# Patient Record
Sex: FEMALE | Race: AMERICAN INDIAN OR ALASKA NATIVE
[De-identification: names, ages, dates, MRNs, and addresses within clinical notes are randomized per-mention and may not be internally consistent; named-entity substitution may affect disease eponyms.]

---

## 2017-08-17 ENCOUNTER — HOSPITAL ENCOUNTER (OUTPATIENT)
Dept: HOSPITAL 5 - SPVWC | Age: 47
Discharge: HOME | End: 2017-08-17
Attending: FAMILY MEDICINE
Payer: COMMERCIAL

## 2017-08-17 DIAGNOSIS — Z12.31: Primary | ICD-10-CM

## 2017-08-17 PROCEDURE — 77067 SCR MAMMO BI INCL CAD: CPT

## 2017-08-17 PROCEDURE — G0202 SCR MAMMO BI INCL CAD: HCPCS

## 2017-08-17 NOTE — MAMMOGRAPHY REPORT
Screening mammogram:



Routine views compared to her prior exams dating back to 2015. There is 

a market progression of calcifications distributed throughout the upper 

outer right breast. Many of these are scattered and singular but there 

are some clusters. There is minimal pleomorphism. The remainder the 

breast pattern bilaterally is heterogeneous and diffusely distributed. 

It is unchanged.



CAD used.



Impressions:



Progressive calcifications of the right breast.



Recommendation:



Right breast magnification views of the upper outer quadrant.



BI-RADS CATEGORY:  0 = Needs additional imaging evaluation



ACR BI-RADS MAMMOGRAPHIC CODES:

0 = Needs additional imaging evaluation; 1 = Negative; 2 = Benign; 3 = 

Probably benign; 4 = Suspicious; 5 = Malignant; 6 = Known biopsy-proven 

malignancy



COMMENT:

      1.   Dense breast tissue, i.e., adenosis, fibrocystic    

            changes, etc., may obscure an underlying neoplasm.

      2.   Approximately 10% of cancers are not detected with

            mammography.

      3.   A negative mammography report should not delay biopsy 

            if a clinically suspicious mass is present.

## 2017-09-05 ENCOUNTER — HOSPITAL ENCOUNTER (OUTPATIENT)
Dept: HOSPITAL 5 - SPVWC | Age: 47
Discharge: HOME | End: 2017-09-05
Attending: FAMILY MEDICINE
Payer: MEDICARE

## 2017-09-05 DIAGNOSIS — R92.1: Primary | ICD-10-CM

## 2017-09-05 NOTE — MAMMOGRAPHY REPORT
RIGHT DIGITAL DIAGNOSTIC MAMMOGRAM : 09/05/17 08:18:00



CLINICAL: Recalled for calcifications.



COMPARISON:08/17/17



FINDINGS: LM and CC magnification views demonstrate scattered 

retroareolar central and slightly lateral and superior primarily 

punctate calcifications.A few calcifications layer on the lateral view. 

 No associated mass or architectural distortion.  







IMPRESSION: Probably benign calcifications.



BI-RADS CATEGORY:  3 -- Probably Benign



RECOMMENDATION: 6 month follow-up magnification views right breast.



ACR BI-RADS MAMMOGRAPHIC CODES:

0 = Needs additional imaging evaluation; 1 = Negative; 2 = Benign; 3 = 

Probably benign; 4 = Suspicious; 5 = Malignant; 6 = Known biopsy-proven 

malignancy



COMMENT:

      1.   Dense breast tissue, i.e., adenosis, fibrocystic 

            changes, etc., may obscure an underlying neoplasm.

      2.   Approximately 10% of cancers are not detected with

            mammography.

      3.   A negative mammography report should not delay biopsy 

            if a clinically suspicious mass is present.





COMMENT:

Patient follow-up letters are generated by our [employer] application.

## 2018-03-02 ENCOUNTER — HOSPITAL ENCOUNTER (OUTPATIENT)
Dept: HOSPITAL 5 - SPVWC | Age: 48
Discharge: HOME | End: 2018-03-02
Attending: FAMILY MEDICINE
Payer: MEDICARE

## 2018-03-02 DIAGNOSIS — R92.1: Primary | ICD-10-CM

## 2018-03-02 NOTE — MAMMOGRAPHY REPORT
RIGHT DIGITAL DIAGNOSTIC MAMMOGRAM : 03/02/18 08:05:00



CLINICAL: Six month follow-up calcifications.



COMPARISON:09/05/17



FINDINGS: Routine views plus LM and CC magnification views were 

performed.  Stable scattered upper outer and central retroareolar 

predominantly punctate calcifications.  No suspicious forms or 

distributions.  A few calcifications layer on lateral spots.  No mass 

or architectural distortion.



IMPRESSION: Stable probably benign calcifications.



BI-RADS CATEGORY:  3 -- Probably Benign



RECOMMENDATION: Bilateral diagnostic mammogram with right magnification 

views in six months.



ACR BI-RADS MAMMOGRAPHIC CODES:

0 = Needs additional imaging evaluation; 1 = Negative; 2 = Benign; 3 = 

Probably benign; 4 = Suspicious; 5 = Malignant; 6 = Known biopsy-proven 

malignancy



COMMENT:

      1.   Dense breast tissue, i.e., adenosis, fibrocystic 

            changes, etc., may obscure an underlying neoplasm.

      2.   Approximately 10% of cancers are not detected with

            mammography.

      3.   A negative mammography report should not delay biopsy 

            if a clinically suspicious mass is present.





COMMENT:

Patient follow-up letters are generated by our [employer] application.

## 2018-09-06 ENCOUNTER — HOSPITAL ENCOUNTER (OUTPATIENT)
Dept: HOSPITAL 5 - SPVWC | Age: 48
Discharge: HOME | End: 2018-09-06
Attending: FAMILY MEDICINE
Payer: MEDICARE

## 2018-09-06 DIAGNOSIS — R92.8: Primary | ICD-10-CM

## 2018-09-06 PROCEDURE — 77066 DX MAMMO INCL CAD BI: CPT

## 2018-09-06 NOTE — MAMMOGRAPHY REPORT
BILATERAL DIGITAL DIAGNOSTIC MAMMOGRAM with CAD: 09/06/18 08:27:00



CLINICAL: Six month follow-up right calcifications and routine 

screening of the left breast.



COMPARISON:03/02/18 and 09/05/17 right mammograms and 08/17/17 

bilateral mammogram.



FINDINGS:Routine views plus spot magnification views of the right 

breast were performed.  Stable scattered right upper outer and central 

retroareolar calcifications which are predominantly punctate.  A few 

calcifications layer.  No suspicious forms.  No mass or architectural 

distortion.  The left breast is negative with a few scattered benign 

calcifications. 







IMPRESSION: Bilateral benign calcifications and no suspicious finding.



BI-RADS CATEGORY:  2 - - Benign



RECOMMENDATION: Return to routine mammographic screening in one year.



ACR BI-RADS MAMMOGRAPHIC CODES:

0 = Needs additional imaging evaluation; 1 = Negative; 2 = Benign; 3 = 

Probably benign; 4 = Suspicious; 5 = Malignant; 6 = Known biopsy-proven 

malignancy



COMMENT:

      1.   Dense breast tissue, i.e., adenosis, fibrocystic 

            changes, etc., may obscure an underlying neoplasm.

      2.   Approximately 10% of cancers are not detected with

            mammography.

      3.   A negative mammography report should not delay biopsy 

            if a clinically suspicious mass is present.





COMMENT:

Patient follow-up letters are generated by our [employer] application.

## 2019-10-18 NOTE — MAMMOGRAPHY REPORT
DIGITAL SCREENING MAMMOGRAM WITH CAD, 10/18/2019



INDICATION: Routine screening mammography. 



TECHNIQUE:  Digital bilateral  2D mammography was obtained in the craniocaudal and mediolateral obliq
ue projections. This examination was interpreted with the benefit of Computer-Aided Detection analysi
s.



COMPARISON: 9/6/2018



FINDINGS: 



Breast Density: The breasts are heterogeneously dense, which may obscure small masses.



There is no evidence of dominant mass, suspicious calcifications or architectural distortion in eithe
r breast. A few bilateral scattered calcifications with benign morphology.



IMPRESSION: No mammographic evidence of malignancy.



Follow up recommendation: Routine yearly



BI-RADS Category 2:  Benign.



A "normal" or negative report should not discourage follow up or biopsy of a clinically significant f
inding.



A written summary of these findings will be mailed to the patient. The patient will be entered into a
 mammography reporting system which will generate a reminder letter for the patient's next appointmen
t at the appropriate interval.



The [employer] recommends yearly mammograms starting at age 40 and continuing as l
rasheed as a woman is in good health.  Breast MRI is recommended for women with an approximate 20-25% or 
greater lifetime risk of breast cancer, including women with a strong family history of breast or ova
latanya cancer or who have been treated for Hodgkin's disease.



Signer Name: Gabriele Weiss MD 

Signed: 10/18/2019 1:45 PM

 Workstation Name: [id number]